# Patient Record
Sex: FEMALE | Race: WHITE | Employment: FULL TIME | URBAN - METROPOLITAN AREA
[De-identification: names, ages, dates, MRNs, and addresses within clinical notes are randomized per-mention and may not be internally consistent; named-entity substitution may affect disease eponyms.]

---

## 2018-03-24 ENCOUNTER — OFFICE VISIT (OUTPATIENT)
Dept: INTERNAL MEDICINE CLINIC | Age: 43
End: 2018-03-24
Payer: COMMERCIAL

## 2018-03-24 VITALS
DIASTOLIC BLOOD PRESSURE: 78 MMHG | SYSTOLIC BLOOD PRESSURE: 120 MMHG | BODY MASS INDEX: 24.49 KG/M2 | TEMPERATURE: 98.3 F | WEIGHT: 147 LBS | HEIGHT: 65 IN | HEART RATE: 88 BPM | RESPIRATION RATE: 15 BRPM

## 2018-03-24 DIAGNOSIS — R10.31 RIGHT LOWER QUADRANT ABDOMINAL PAIN: Primary | ICD-10-CM

## 2018-03-24 PROCEDURE — 99213 OFFICE O/P EST LOW 20 MIN: CPT | Performed by: FAMILY MEDICINE

## 2018-03-24 ASSESSMENT — ENCOUNTER SYMPTOMS
EYES NEGATIVE: 1
RESPIRATORY NEGATIVE: 1
COUGH: 0
CHEST TIGHTNESS: 0
GASTROINTESTINAL NEGATIVE: 1
RHINORRHEA: 0

## 2018-04-07 ENCOUNTER — HOSPITAL ENCOUNTER (OUTPATIENT)
Dept: ULTRASOUND IMAGING | Age: 43
Discharge: HOME | End: 2018-04-09
Payer: COMMERCIAL

## 2018-04-07 DIAGNOSIS — R10.31 RIGHT LOWER QUADRANT ABDOMINAL PAIN: ICD-10-CM

## 2018-04-07 PROCEDURE — 76856 US EXAM PELVIC COMPLETE: CPT

## 2018-04-11 ENCOUNTER — OFFICE VISIT (OUTPATIENT)
Dept: INTERNAL MEDICINE CLINIC | Age: 43
End: 2018-04-11
Payer: COMMERCIAL

## 2018-04-11 VITALS
TEMPERATURE: 98.3 F | OXYGEN SATURATION: 98 % | SYSTOLIC BLOOD PRESSURE: 120 MMHG | BODY MASS INDEX: 24.76 KG/M2 | DIASTOLIC BLOOD PRESSURE: 82 MMHG | HEART RATE: 94 BPM | WEIGHT: 148.6 LBS | HEIGHT: 65 IN

## 2018-04-11 DIAGNOSIS — D25.9 UTERINE LEIOMYOMA, UNSPECIFIED LOCATION: ICD-10-CM

## 2018-04-11 DIAGNOSIS — R10.31 RLQ ABDOMINAL PAIN: Primary | ICD-10-CM

## 2018-04-11 PROCEDURE — 99213 OFFICE O/P EST LOW 20 MIN: CPT | Performed by: NURSE PRACTITIONER

## 2018-04-11 ASSESSMENT — ENCOUNTER SYMPTOMS
WHEEZING: 0
VOMITING: 0
CHEST TIGHTNESS: 0
SHORTNESS OF BREATH: 0
RHINORRHEA: 0
ABDOMINAL PAIN: 1
RESPIRATORY NEGATIVE: 1
BLOOD IN STOOL: 0
NAUSEA: 0
CONSTIPATION: 0
DIARRHEA: 0
COUGH: 0

## 2018-04-11 ASSESSMENT — PATIENT HEALTH QUESTIONNAIRE - PHQ9
SUM OF ALL RESPONSES TO PHQ QUESTIONS 1-9: 2
2. FEELING DOWN, DEPRESSED OR HOPELESS: 1
1. LITTLE INTEREST OR PLEASURE IN DOING THINGS: 1
SUM OF ALL RESPONSES TO PHQ9 QUESTIONS 1 & 2: 2

## 2018-04-21 ENCOUNTER — HOSPITAL ENCOUNTER (OUTPATIENT)
Dept: CT IMAGING | Age: 43
Discharge: HOME | End: 2018-04-23
Payer: COMMERCIAL

## 2018-04-21 VITALS — SYSTOLIC BLOOD PRESSURE: 120 MMHG | HEART RATE: 78 BPM | DIASTOLIC BLOOD PRESSURE: 80 MMHG | RESPIRATION RATE: 20 BRPM

## 2018-04-21 DIAGNOSIS — R10.31 RLQ ABDOMINAL PAIN: ICD-10-CM

## 2018-04-21 PROCEDURE — 74177 CT ABD & PELVIS W/CONTRAST: CPT

## 2018-04-21 PROCEDURE — 6360000004 HC RX CONTRAST MEDICATION: Performed by: FAMILY MEDICINE

## 2018-04-21 RX ORDER — SODIUM CHLORIDE 0.9 % (FLUSH) 0.9 %
10 SYRINGE (ML) INJECTION
Status: DISPENSED | OUTPATIENT
Start: 2018-04-21 | End: 2018-04-21

## 2018-04-21 RX ADMIN — IOPAMIDOL 100 ML: 755 INJECTION, SOLUTION INTRAVENOUS at 08:59

## 2019-01-02 ENCOUNTER — TELEPHONE (OUTPATIENT)
Dept: INTERNAL MEDICINE CLINIC | Age: 44
End: 2019-01-02

## 2019-01-02 ENCOUNTER — OFFICE VISIT (OUTPATIENT)
Dept: INTERNAL MEDICINE CLINIC | Age: 44
End: 2019-01-02
Payer: COMMERCIAL

## 2019-01-02 VITALS
OXYGEN SATURATION: 99 % | WEIGHT: 136.8 LBS | TEMPERATURE: 98.3 F | DIASTOLIC BLOOD PRESSURE: 80 MMHG | HEIGHT: 65 IN | SYSTOLIC BLOOD PRESSURE: 108 MMHG | RESPIRATION RATE: 16 BRPM | HEART RATE: 114 BPM | BODY MASS INDEX: 22.79 KG/M2

## 2019-01-02 DIAGNOSIS — R10.9 FLANK PAIN: ICD-10-CM

## 2019-01-02 DIAGNOSIS — B96.89 BV (BACTERIAL VAGINOSIS): Primary | ICD-10-CM

## 2019-01-02 DIAGNOSIS — N76.0 BV (BACTERIAL VAGINOSIS): Primary | ICD-10-CM

## 2019-01-02 LAB
BILIRUBIN, POC: NORMAL
BLOOD URINE, POC: NORMAL
CLARITY, POC: CLEAR
COLOR, POC: NORMAL
GLUCOSE URINE, POC: NORMAL
KETONES, POC: 5
LEUKOCYTE EST, POC: NORMAL
NITRITE, POC: NORMAL
PH, POC: 6
PROTEIN, POC: NORMAL
SPECIFIC GRAVITY, POC: 1.02
UROBILINOGEN, POC: 0.2

## 2019-01-02 PROCEDURE — 81002 URINALYSIS NONAUTO W/O SCOPE: CPT | Performed by: NURSE PRACTITIONER

## 2019-01-02 PROCEDURE — 99213 OFFICE O/P EST LOW 20 MIN: CPT | Performed by: NURSE PRACTITIONER

## 2019-01-02 RX ORDER — FLUCONAZOLE 150 MG/1
TABLET ORAL
Qty: 2 TABLET | Refills: 0 | Status: SHIPPED | OUTPATIENT
Start: 2019-01-02 | End: 2019-01-08 | Stop reason: ALTCHOICE

## 2019-01-02 RX ORDER — METRONIDAZOLE 7.5 MG/G
1 GEL VAGINAL DAILY
Qty: 1 TUBE | Refills: 0 | Status: SHIPPED | OUTPATIENT
Start: 2019-01-02 | End: 2019-01-07

## 2019-01-08 ENCOUNTER — OFFICE VISIT (OUTPATIENT)
Dept: INTERNAL MEDICINE CLINIC | Age: 44
End: 2019-01-08
Payer: COMMERCIAL

## 2019-01-08 VITALS
HEART RATE: 104 BPM | RESPIRATION RATE: 20 BRPM | BODY MASS INDEX: 22.66 KG/M2 | HEIGHT: 65 IN | SYSTOLIC BLOOD PRESSURE: 120 MMHG | OXYGEN SATURATION: 98 % | TEMPERATURE: 98.2 F | WEIGHT: 136 LBS | DIASTOLIC BLOOD PRESSURE: 76 MMHG

## 2019-01-08 DIAGNOSIS — N89.8 VAGINAL ODOR: Primary | ICD-10-CM

## 2019-01-08 PROCEDURE — 99213 OFFICE O/P EST LOW 20 MIN: CPT | Performed by: NURSE PRACTITIONER

## 2019-01-08 RX ORDER — M-VIT,TX,IRON,MINS/CALC/FOLIC 27MG-0.4MG
1 TABLET ORAL DAILY
COMMUNITY

## 2019-01-08 ASSESSMENT — ENCOUNTER SYMPTOMS
VOMITING: 0
WHEEZING: 0
SHORTNESS OF BREATH: 0
NAUSEA: 0
RHINORRHEA: 0
COUGH: 0
ABDOMINAL PAIN: 0
DIARRHEA: 0
EYES NEGATIVE: 1

## 2019-01-19 ASSESSMENT — ENCOUNTER SYMPTOMS
COUGH: 0
EYES NEGATIVE: 1
BACK PAIN: 0
SHORTNESS OF BREATH: 0
CONSTIPATION: 0
DIARRHEA: 0
VOMITING: 0
ABDOMINAL PAIN: 0
SORE THROAT: 0
NAUSEA: 0
WHEEZING: 0

## 2019-02-07 ENCOUNTER — OFFICE VISIT (OUTPATIENT)
Dept: INTERNAL MEDICINE CLINIC | Age: 44
End: 2019-02-07
Payer: COMMERCIAL

## 2019-02-07 VITALS
HEIGHT: 65 IN | SYSTOLIC BLOOD PRESSURE: 112 MMHG | BODY MASS INDEX: 22.19 KG/M2 | OXYGEN SATURATION: 100 % | RESPIRATION RATE: 16 BRPM | HEART RATE: 77 BPM | WEIGHT: 133.2 LBS | DIASTOLIC BLOOD PRESSURE: 80 MMHG | TEMPERATURE: 98.3 F

## 2019-02-07 DIAGNOSIS — Z13.220 SCREENING, LIPID: ICD-10-CM

## 2019-02-07 DIAGNOSIS — L75.0 BODY ODOR: Primary | ICD-10-CM

## 2019-02-07 PROCEDURE — 99213 OFFICE O/P EST LOW 20 MIN: CPT | Performed by: FAMILY MEDICINE

## 2019-02-07 RX ORDER — ESCITALOPRAM OXALATE 10 MG/1
10 TABLET ORAL DAILY
Qty: 30 TABLET | Refills: 3 | Status: SHIPPED | OUTPATIENT
Start: 2019-02-07 | End: 2019-03-29 | Stop reason: SDUPTHER

## 2019-02-07 ASSESSMENT — ENCOUNTER SYMPTOMS
RHINORRHEA: 0
GASTROINTESTINAL NEGATIVE: 1
CHEST TIGHTNESS: 0
COUGH: 0
EYES NEGATIVE: 1
RESPIRATORY NEGATIVE: 1

## 2019-02-25 ENCOUNTER — OFFICE VISIT (OUTPATIENT)
Dept: INTERNAL MEDICINE CLINIC | Age: 44
End: 2019-02-25
Payer: COMMERCIAL

## 2019-02-25 VITALS
TEMPERATURE: 98.8 F | SYSTOLIC BLOOD PRESSURE: 120 MMHG | RESPIRATION RATE: 16 BRPM | HEIGHT: 65 IN | BODY MASS INDEX: 21.99 KG/M2 | DIASTOLIC BLOOD PRESSURE: 68 MMHG | OXYGEN SATURATION: 99 % | WEIGHT: 132 LBS | HEART RATE: 67 BPM

## 2019-02-25 DIAGNOSIS — R23.8 SCALP IRRITATION: ICD-10-CM

## 2019-02-25 DIAGNOSIS — F41.9 ANXIETY: Primary | ICD-10-CM

## 2019-02-25 PROCEDURE — 99213 OFFICE O/P EST LOW 20 MIN: CPT | Performed by: FAMILY MEDICINE

## 2019-02-25 ASSESSMENT — ENCOUNTER SYMPTOMS
COUGH: 0
RESPIRATORY NEGATIVE: 1
RHINORRHEA: 0
CHEST TIGHTNESS: 0
GASTROINTESTINAL NEGATIVE: 1
EYES NEGATIVE: 1

## 2019-03-29 RX ORDER — ESCITALOPRAM OXALATE 10 MG/1
10 TABLET ORAL DAILY
Qty: 30 TABLET | Refills: 3 | Status: SHIPPED | OUTPATIENT
Start: 2019-03-29 | End: 2019-07-01 | Stop reason: SDUPTHER

## 2019-04-12 ENCOUNTER — OFFICE VISIT (OUTPATIENT)
Dept: INTERNAL MEDICINE CLINIC | Age: 44
End: 2019-04-12
Payer: COMMERCIAL

## 2019-04-12 VITALS
WEIGHT: 136 LBS | RESPIRATION RATE: 14 BRPM | HEART RATE: 116 BPM | DIASTOLIC BLOOD PRESSURE: 82 MMHG | HEIGHT: 65 IN | TEMPERATURE: 98.9 F | OXYGEN SATURATION: 98 % | SYSTOLIC BLOOD PRESSURE: 118 MMHG | BODY MASS INDEX: 22.66 KG/M2

## 2019-04-12 DIAGNOSIS — Z13.220 SCREENING, LIPID: ICD-10-CM

## 2019-04-12 DIAGNOSIS — F41.9 ANXIETY: Primary | ICD-10-CM

## 2019-04-12 PROCEDURE — 99213 OFFICE O/P EST LOW 20 MIN: CPT | Performed by: FAMILY MEDICINE

## 2019-04-12 ASSESSMENT — PATIENT HEALTH QUESTIONNAIRE - PHQ9
SUM OF ALL RESPONSES TO PHQ9 QUESTIONS 1 & 2: 0
SUM OF ALL RESPONSES TO PHQ QUESTIONS 1-9: 0
SUM OF ALL RESPONSES TO PHQ QUESTIONS 1-9: 0
2. FEELING DOWN, DEPRESSED OR HOPELESS: 0
1. LITTLE INTEREST OR PLEASURE IN DOING THINGS: 0

## 2019-04-12 ASSESSMENT — ENCOUNTER SYMPTOMS
EYES NEGATIVE: 1
GASTROINTESTINAL NEGATIVE: 1
COUGH: 0
CHEST TIGHTNESS: 0
RHINORRHEA: 0
RESPIRATORY NEGATIVE: 1

## 2019-04-12 NOTE — PROGRESS NOTES
Patient is seen in follow up for   Chief Complaint   Patient presents with   [place] Anxiety     Patient here following up on her anxiety, states now that she works from home she feels much better.  Health Maintenance     Lipid ordered - declines vaccine      HPIhere for follow up feeling better with her anxiety 90% better. Past Medical History:   Diagnosis Date    Depression      Patient Active Problem List    Diagnosis Date Noted    Anxiety 02/25/2019    Depression      No past surgical history on file. No family history on file.   Social History     Socioeconomic History    Marital status: Single     Spouse name: None    Number of children: None    Years of education: None    Highest education level: None   Occupational History    None   Social Needs    Financial resource strain: None    Food insecurity:     Worry: None     Inability: None    Transportation needs:     Medical: None     Non-medical: None   Tobacco Use    Smoking status: Current Every Day Smoker     Packs/day: 0.50     Years: 15.00     Pack years: 7.50     Types: Cigarettes    Smokeless tobacco: Never Used   Substance and Sexual Activity    Alcohol use: Yes     Comment: occ    Drug use: No    Sexual activity: None   Lifestyle    Physical activity:     Days per week: None     Minutes per session: None    Stress: None   Relationships    Social connections:     Talks on phone: None     Gets together: None     Attends Quaker service: None     Active member of club or organization: None     Attends meetings of clubs or organizations: None     Relationship status: None    Intimate partner violence:     Fear of current or ex partner: None     Emotionally abused: None     Physically abused: None     Forced sexual activity: None   Other Topics Concern    None   Social History Narrative    None     Current Outpatient Medications   Medication Sig Dispense Refill    escitalopram (LEXAPRO) 10 MG tablet Take 1 tablet by mouth daily 30 tablet 3    selenium sulfide (SELSUN BLUE) 1 % shampoo Apply topically daily as needed for Itching 420 mL 1    aluminum chloride (DRYSOL) 20 % external solution Apply topically nightly. 35 mL 3    Multiple Vitamins-Minerals (THERAPEUTIC MULTIVITAMIN-MINERALS) tablet Take 1 tablet by mouth daily      Lactobacillus (PROBIOTIC ACIDOPHILUS PO) Take by mouth       No current facility-administered medications for this visit. Current Outpatient Medications on File Prior to Visit   Medication Sig Dispense Refill    escitalopram (LEXAPRO) 10 MG tablet Take 1 tablet by mouth daily 30 tablet 3    selenium sulfide (SELSUN BLUE) 1 % shampoo Apply topically daily as needed for Itching 420 mL 1    aluminum chloride (DRYSOL) 20 % external solution Apply topically nightly. 35 mL 3    Multiple Vitamins-Minerals (THERAPEUTIC MULTIVITAMIN-MINERALS) tablet Take 1 tablet by mouth daily      Lactobacillus (PROBIOTIC ACIDOPHILUS PO) Take by mouth       No current facility-administered medications on file prior to visit. Allergies   Allergen Reactions    Clindamycin Hcl Diarrhea     Diarrhea     Doxycycline Monohydrate Other (See Comments)     Abdominal cramping, nausea     Metronidazole Other (See Comments)     Abdominal cramping, nausea      Health Maintenance   Topic Date Due    Pneumococcal 0-64 years Vaccine (1 of 1 - PPSV23) 03/26/1983    Lipid screen  03/26/2017    DTaP/Tdap/Td vaccine (1 - Tdap) 01/02/2020 (Originally 3/26/1996)    HIV screen  01/02/2020 (Originally 3/26/1992)    Cervical cancer screen  12/19/2021    Flu vaccine  Completed       Review of Systems     Review of Systems   Constitutional: Negative for activity change, appetite change, fatigue and fever. HENT: Negative for congestion and rhinorrhea. Eyes: Negative. Respiratory: Negative. Negative for cough and chest tightness. Cardiovascular: Negative. Gastrointestinal: Negative. Endocrine: Negative.     Genitourinary: Negative. Musculoskeletal: Negative. Skin: Negative. Neurological: Negative for dizziness, light-headedness and numbness. Hematological: Negative. Psychiatric/Behavioral: Negative. Physical Exam  Vitals:    04/12/19 0944   BP: 118/82   Site: Left Upper Arm   Position: Sitting   Cuff Size: Small Adult   Pulse: 116   Resp: 14   Temp: 98.9 °F (37.2 °C)   TempSrc: Oral   SpO2: 98%   Weight: 136 lb (61.7 kg)   Height: 5' 5\" (1.651 m)       Physical Exam   Constitutional: She appears well-developed and well-nourished. HENT:   Right Ear: External ear normal.   Left Ear: External ear normal.   Eyes: Pupils are equal, round, and reactive to light. Conjunctivae are normal.   Neck: Normal range of motion. Neck supple. No thyromegaly present. Cardiovascular: Normal rate, regular rhythm and normal heart sounds. No murmur heard. Pulmonary/Chest: Effort normal and breath sounds normal.   Abdominal: Soft. Bowel sounds are normal. She exhibits no distension. There is no tenderness. Musculoskeletal: Normal range of motion. She exhibits no edema or tenderness. Lymphadenopathy:     She has no cervical adenopathy. Neurological: She is alert. No cranial nerve deficit. Coordination normal.       Assessment   Diagnosis Orders   1. Anxiety     2. Screening, lipid  Lipid Panel     Problem List     Anxiety - Primary    Relevant Medications    escitalopram (LEXAPRO) 10 MG tablet          Plan  Orders Placed This Encounter   Procedures    Lipid Panel     Standing Status:   Future     Standing Expiration Date:   4/11/2020     Order Specific Question:   Is Patient Fasting?/# of Hours     Answer:   8-12     No orders of the defined types were placed in this encounter. No follow-ups on file.   Keon Raya MD

## 2019-04-17 DIAGNOSIS — Z13.220 SCREENING, LIPID: ICD-10-CM

## 2019-04-17 LAB
CHOLESTEROL, TOTAL: 193 MG/DL (ref 0–199)
HDLC SERPL-MCNC: 78 MG/DL (ref 40–59)
LDL CHOLESTEROL CALCULATED: 99 MG/DL (ref 0–129)
TRIGL SERPL-MCNC: 79 MG/DL (ref 0–150)

## 2019-07-01 RX ORDER — ESCITALOPRAM OXALATE 10 MG/1
TABLET ORAL
Qty: 90 TABLET | Refills: 1 | Status: SHIPPED | OUTPATIENT
Start: 2019-07-01 | End: 2020-01-27

## 2019-08-17 ENCOUNTER — HOSPITAL ENCOUNTER (OUTPATIENT)
Dept: GENERAL RADIOLOGY | Age: 44
Discharge: HOME | End: 2019-08-19
Payer: COMMERCIAL

## 2019-08-17 ENCOUNTER — OFFICE VISIT (OUTPATIENT)
Dept: FAMILY MEDICINE CLINIC | Age: 44
End: 2019-08-17
Payer: COMMERCIAL

## 2019-08-17 VITALS
OXYGEN SATURATION: 99 % | SYSTOLIC BLOOD PRESSURE: 120 MMHG | TEMPERATURE: 99.2 F | HEART RATE: 69 BPM | BODY MASS INDEX: 23.96 KG/M2 | WEIGHT: 143.8 LBS | HEIGHT: 65 IN | DIASTOLIC BLOOD PRESSURE: 80 MMHG

## 2019-08-17 DIAGNOSIS — M25.572 ACUTE LEFT ANKLE PAIN: Primary | ICD-10-CM

## 2019-08-17 DIAGNOSIS — M25.572 ACUTE LEFT ANKLE PAIN: ICD-10-CM

## 2019-08-17 PROCEDURE — 73630 X-RAY EXAM OF FOOT: CPT

## 2019-08-17 PROCEDURE — 73610 X-RAY EXAM OF ANKLE: CPT

## 2019-08-17 PROCEDURE — 99213 OFFICE O/P EST LOW 20 MIN: CPT | Performed by: NURSE PRACTITIONER

## 2019-08-17 ASSESSMENT — ENCOUNTER SYMPTOMS
VOMITING: 0
NAUSEA: 0
SHORTNESS OF BREATH: 0
COUGH: 0
BACK PAIN: 0
COLOR CHANGE: 0
CONSTIPATION: 0
TROUBLE SWALLOWING: 0
VOICE CHANGE: 0
DIARRHEA: 0
ABDOMINAL PAIN: 0
SORE THROAT: 0

## 2019-08-17 NOTE — PATIENT INSTRUCTIONS
care if:    · You cannot move or stand on your foot.     · Your foot looks twisted or out of its normal position.     · Your foot is not stable when you step down.     · You have signs of infection, such as:  ? Increased pain, swelling, warmth, or redness. ? Red streaks leading from the sore area. ? Pus draining from a place on your foot. ? A fever.     · Your foot is numb or tingly.    Watch closely for changes in your health, and be sure to contact your doctor if:    · You do not get better as expected.     · You have bruises from an injury that last longer than 2 weeks. Where can you learn more? Go to [email or web address] and sign in to your [employer] account. Enter L888 in the inDegree box to learn more about \"Foot Pain: Care Instructions. \"     If you do not have an account, please click on the \"Sign Up Now\" link. Current as of: September 20, 2018  Content Version: 12.1  © 0480-5799 Healthwise, Incorporated. Care instructions adapted under license by [facility] [place] ([facility]). If you have questions about a medical condition or this instruction, always ask your healthcare professional. [place] [address] any warranty or liability for your use of this information.

## 2019-08-17 NOTE — PROGRESS NOTES
Subjective  Lana Jayne Boogie, 45 y.o. female presents today with:  Chief Complaint   Patient presents with    Foot Injury     pt triped over her own feet about a week ago pt. did fall she caught herslef, but the top of her foot was hurting and after her ankle started to hurt as well. HPI     Presents today with a chief complaint of left ankle and left foot pain for over a week. She notes that she tripped over a week ago and is still having pain in the left ankle and left foot. She describes the pain as a constant aching sensation that is worse with ambulation and weight bearing. She notes that she has been trying to stay off of the left foot and ankle as much as possible. Denies any pain radiation. She denies any associated symptoms. Review of Systems   Constitutional: Negative for appetite change and fever. HENT: Negative for drooling, ear pain, sore throat, trouble swallowing and voice change. Respiratory: Negative for cough and shortness of breath. Cardiovascular: Negative for chest pain. Gastrointestinal: Negative for abdominal pain, constipation, diarrhea, nausea and vomiting. Genitourinary: Negative for decreased urine volume and dysuria. Musculoskeletal: Positive for arthralgias (left foot and left ankle pain for over one week). Negative for back pain. Skin: Negative for color change. Neurological: Negative for dizziness, weakness, light-headedness and headaches. Psychiatric/Behavioral: Negative for agitation and behavioral problems. All other systems reviewed and are negative. Past Medical History:   Diagnosis Date    Depression      History reviewed. No pertinent surgical history.   Social History     Socioeconomic History    Marital status: Single     Spouse name: Not on file    Number of children: Not on file    Years of education: Not on file    Highest education level: Not on file   Occupational History    Not on file   Social Needs    Financial resource strain: Not on file    Food insecurity:     Worry: Not on file     Inability: Not on file    Transportation needs:     Medical: Not on file     Non-medical: Not on file   Tobacco Use    Smoking status: Current Every Day Smoker     Packs/day: 0.50     Years: 15.00     Pack years: 7.50     Types: Cigarettes    Smokeless tobacco: Never Used   Substance and Sexual Activity    Alcohol use: Yes     Comment: occ    Drug use: No    Sexual activity: Not on file   Lifestyle    Physical activity:     Days per week: Not on file     Minutes per session: Not on file    Stress: Not on file   Relationships    Social connections:     Talks on phone: Not on file     Gets together: Not on file     Attends Protestant service: Not on file     Active member of club or organization: Not on file     Attends meetings of clubs or organizations: Not on file     Relationship status: Not on file    Intimate partner violence:     Fear of current or ex partner: Not on file     Emotionally abused: Not on file     Physically abused: Not on file     Forced sexual activity: Not on file   Other Topics Concern    Not on file   Social History Narrative    Not on file     History reviewed. No pertinent family history. Allergies   Allergen Reactions    Clindamycin Hcl Diarrhea     Diarrhea     Doxycycline Monohydrate Other (See Comments)     Abdominal cramping, nausea     Metronidazole Other (See Comments)     Abdominal cramping, nausea      Current Outpatient Medications   Medication Sig Dispense Refill    escitalopram (LEXAPRO) 10 MG tablet TAKE 1 TABLET BY MOUTH EVERY DAY 90 tablet 1    Multiple Vitamins-Minerals (THERAPEUTIC MULTIVITAMIN-MINERALS) tablet Take 1 tablet by mouth daily      Lactobacillus (PROBIOTIC ACIDOPHILUS PO) Take by mouth       No current facility-administered medications for this visit. PMH, Surgical Hx, Family Hx, and Social Hx reviewed and updated. Health Maintenance reviewed.     Objective  Vitals: discussed with the patient. Discussed signs and symptoms which require immediate follow-up in ED/call to 911. Understanding verbalized. Patient provided with a referral for podiatry. Instructed to take tylenol or motrin for any pain and she can rotate ice and heat as well. Patient verbalized understanding and has no further questions. Close follow up to evaluate treatment results and for coordination of care. I have reviewed the patient's medical history in detail and updated the computerized patient record.       Hong López Born, APRN - CNP

## 2019-10-10 ENCOUNTER — OFFICE VISIT (OUTPATIENT)
Dept: FAMILY MEDICINE CLINIC | Age: 44
End: 2019-10-10
Payer: COMMERCIAL

## 2019-10-10 VITALS
WEIGHT: 148 LBS | HEART RATE: 81 BPM | RESPIRATION RATE: 18 BRPM | DIASTOLIC BLOOD PRESSURE: 80 MMHG | TEMPERATURE: 99 F | HEIGHT: 65 IN | SYSTOLIC BLOOD PRESSURE: 108 MMHG | BODY MASS INDEX: 24.66 KG/M2 | OXYGEN SATURATION: 99 %

## 2019-10-10 DIAGNOSIS — M25.572 ACUTE LEFT ANKLE PAIN: ICD-10-CM

## 2019-10-10 DIAGNOSIS — F41.9 ANXIETY: Primary | ICD-10-CM

## 2019-10-10 PROCEDURE — 99213 OFFICE O/P EST LOW 20 MIN: CPT | Performed by: FAMILY MEDICINE

## 2019-10-10 ASSESSMENT — ENCOUNTER SYMPTOMS
GASTROINTESTINAL NEGATIVE: 1
RHINORRHEA: 0
CHEST TIGHTNESS: 0
COUGH: 0
RESPIRATORY NEGATIVE: 1
EYES NEGATIVE: 1

## 2020-01-27 RX ORDER — ESCITALOPRAM OXALATE 10 MG/1
TABLET ORAL
Qty: 90 TABLET | Refills: 1 | Status: SHIPPED | OUTPATIENT
Start: 2020-01-27 | End: 2020-07-20

## 2020-07-20 RX ORDER — ESCITALOPRAM OXALATE 10 MG/1
TABLET ORAL
Qty: 90 TABLET | Refills: 1 | Status: SHIPPED | OUTPATIENT
Start: 2020-07-20 | End: 2021-01-18 | Stop reason: SDUPTHER

## 2021-01-18 RX ORDER — ESCITALOPRAM OXALATE 10 MG/1
10 TABLET ORAL DAILY
Qty: 30 TABLET | Refills: 0 | Status: SHIPPED | OUTPATIENT
Start: 2021-01-18 | End: 2021-02-24 | Stop reason: SDUPTHER

## 2021-02-09 ENCOUNTER — TELEPHONE (OUTPATIENT)
Dept: FAMILY MEDICINE CLINIC | Age: 46
End: 2021-02-09

## 2021-02-09 RX ORDER — ESCITALOPRAM OXALATE 10 MG/1
TABLET ORAL
Qty: 30 TABLET | Refills: 0 | OUTPATIENT
Start: 2021-02-09

## 2021-02-09 NOTE — TELEPHONE ENCOUNTER
[facility] informing patient that we're unable to fill her prescriptions until she makes an appointment. Has not seen Dr. John Esparza since 10/2019.

## 2021-02-23 ENCOUNTER — E-VISIT (OUTPATIENT)
Dept: FAMILY MEDICINE CLINIC | Age: 46
End: 2021-02-23
Payer: COMMERCIAL

## 2021-02-23 DIAGNOSIS — F41.9 ANXIETY: Primary | ICD-10-CM

## 2021-02-23 PROCEDURE — 99421 OL DIG E/M SVC 5-10 MIN: CPT | Performed by: FAMILY MEDICINE

## 2021-02-23 ASSESSMENT — ANXIETY QUESTIONNAIRES
5. BEING SO RESTLESS THAT IT IS HARD TO SIT STILL: 0-NOT AT ALL
4. TROUBLE RELAXING: NOT AT ALL
7. FEELING AFRAID AS IF SOMETHING AWFUL MIGHT HAPPEN: 0-NOT AT ALL
7. FEELING AFRAID AS IF SOMETHING AWFUL MIGHT HAPPEN: NOT AT ALL
4. TROUBLE RELAXING: 0-NOT AT ALL
6. BECOMING EASILY ANNOYED OR IRRITABLE: NOT AT ALL
6. BECOMING EASILY ANNOYED OR IRRITABLE: 0-NOT AT ALL
2. NOT BEING ABLE TO STOP OR CONTROL WORRYING: NOT AT ALL
1. FEELING NERVOUS, ANXIOUS, OR ON EDGE: NOT AT ALL
GAD7 TOTAL SCORE: 0
5. BEING SO RESTLESS THAT IT IS HARD TO SIT STILL: NOT AT ALL
3. WORRYING TOO MUCH ABOUT DIFFERENT THINGS: NOT AT ALL

## 2021-02-23 ASSESSMENT — PATIENT HEALTH QUESTIONNAIRE - GENERAL
DO YOU HAVE A FASTER HEART RATE THAN USUAL, DOES YOUR HEART RATE FEEL IRREGULAR OR DO YOU FEEL LIKE YOUR HEART IS POUNDING AT REST: N
HAVE YOU BEEN EXPERIENCING AN UNUSUALLY DRY MOUTH: N
HAVE YOU BEEN EXPERIENCING NEW OR WORSENING VISUAL CHANGES: N
HAVE YOU BEEN EXPERIENCING ABDOMINAL DISCOMFORT, NAUSEA OR CHANGES IN YOUR BOWEL PATTERN: N
SINCE YOUR LAST VISIT, HAVE YOU EXPERIENCED EPISODES OF FAINTING OR NEAR FAINTING: N
HAVE YOU BEEN EXPERIENCING NEW OR WORSENING HEADACHES: N
HAVE YOU BEEN EXPERIENCING UNEXPLAINED HIGH FEVERS OR EXCESSIVE SWEATING: N
HAVE YOU BEEN EXPERIENCING NEW OR WORSENING MUSCLE TWITCHING, SHAKINESS, OR OTHER UNUSUAL CHANGES IN YOUR MUSCLE MOVEMENT: N
HAVE YOU BEEN EXPERIENCING NEW OR WORSENING DIFFICULTY WITH URINATION OR CHANGE IN URINARY PATTERN: N
HAVE YOU BEEN EXPERIENCING VERY LOW OR VERY HIGH MOODS: N
HAVE YOU BEEN EXPERIENCING VIVID DREAMS OR NIGHTMARES THAT INTERFERE WITH YOUR SLEEP: N
HAVE YOU BEEN EXPERIENCING INVOLUNTARY WEIGHT GAIN OR LOSS: N
HAVE YOU BEEN EXPERIENCING RACING THOUGHTS OR IMPULSIVE BEHAVIOR: N
HAVE YOU BEEN EXPERIENCING HALLUCINATIONS OR CONFUSION: N
HAVE YOU BEEN EXPERIENCING LIGHT HEADEDNESS, WOOZINESS, OR DIZZINESS, ESPECIALLY UPON STANDING FROM SITTING OR LYING POSITIONS: N
HAVE YOU BEEN EXPERIENCING NEW OR WORSENING PROBLEMS WITH YOUR SEXUAL FUNCTION: N
DO YOU HAVE ANY NEW RASHES OR UNEXPLAINED ITCHING OR SWELLING: N

## 2021-02-23 ASSESSMENT — PATIENT HEALTH QUESTIONNAIRE - PHQ9
3. TROUBLE FALLING OR STAYING ASLEEP: NOT AT ALL
SUM OF ALL RESPONSES TO PHQ9 QUESTIONS 1 & 2: 0
SUM OF ALL RESPONSES TO PHQ QUESTIONS 1-9: 0
6. FEELING BAD ABOUT YOURSELF - OR THAT YOU ARE A FAILURE OR HAVE LET YOURSELF OR YOUR FAMILY DOWN: NOT AT ALL
1. LITTLE INTEREST OR PLEASURE IN DOING THINGS: 0
4. FEELING TIRED OR HAVING LITTLE ENERGY: NOT AT ALL
5. POOR APPETITE OR OVEREATING: NOT AT ALL
10. IF YOU CHECKED OFF ANY PROBLEMS, HOW DIFFICULT HAVE THESE PROBLEMS MADE IT FOR YOU TO DO YOUR WORK, TAKE CARE OF THINGS AT HOME, OR GET ALONG WITH OTHER PEOPLE: NOT DIFFICULT AT ALL
SUM OF ALL RESPONSES TO PHQ QUESTIONS 1-9: 0
8. MOVING OR SPEAKING SO SLOWLY THAT OTHER PEOPLE COULD HAVE NOTICED. OR THE OPPOSITE - BEING SO FIDGETY OR RESTLESS THAT YOU HAVE BEEN MOVING AROUND A LOT MORE THAN USUAL: NOT AT ALL
7. TROUBLE CONCENTRATING ON THINGS, SUCH AS READING THE NEWSPAPER OR WATCHING TELEVISION: NOT AT ALL
1. LITTLE INTEREST OR PLEASURE IN DOING THINGS: NOT AT ALL
SUM OF ALL RESPONSES TO PHQ QUESTIONS 1-9: 0
2. FEELING DOWN, DEPRESSED OR HOPELESS: NOT AT ALL
9. THOUGHTS THAT YOU WOULD BE BETTER OFF DEAD, OR OF HURTING YOURSELF: NOT AT ALL
4. FEELING TIRED OR HAVING LITTLE ENERGY: 0
7. TROUBLE CONCENTRATING ON THINGS, SUCH AS READING THE NEWSPAPER OR WATCHING TELEVISION: 0

## 2021-02-24 RX ORDER — ESCITALOPRAM OXALATE 10 MG/1
10 TABLET ORAL DAILY
Qty: 90 TABLET | Refills: 3 | Status: SHIPPED | OUTPATIENT
Start: 2021-02-24

## 2022-10-13 ENCOUNTER — HOSPITAL ENCOUNTER (OUTPATIENT)
Dept: ULTRASOUND IMAGING | Age: 47
Discharge: HOME | End: 2022-10-15
Payer: COMMERCIAL

## 2022-10-13 DIAGNOSIS — N93.9 HEMORRHAGE IN UTERUS: ICD-10-CM

## 2022-10-13 DIAGNOSIS — N93.8 DUB (DYSFUNCTIONAL UTERINE BLEEDING): ICD-10-CM

## 2022-10-13 PROCEDURE — 76856 US EXAM PELVIC COMPLETE: CPT

## 2022-10-13 PROCEDURE — 76830 TRANSVAGINAL US NON-OB: CPT

## 2022-11-01 ENCOUNTER — HOSPITAL ENCOUNTER (OUTPATIENT)
Dept: WOMENS IMAGING | Age: 47
Discharge: HOME | End: 2022-11-03
Payer: COMMERCIAL

## 2022-11-01 DIAGNOSIS — Z12.31 ENCOUNTER FOR SCREENING MAMMOGRAM FOR MALIGNANT NEOPLASM OF BREAST: ICD-10-CM

## 2022-11-01 PROCEDURE — 77067 SCR MAMMO BI INCL CAD: CPT

## 2023-01-20 ENCOUNTER — HOSPITAL ENCOUNTER (OUTPATIENT)
Dept: PREADMISSION TESTING | Age: 48
Discharge: HOME | End: 2023-01-24
Payer: COMMERCIAL

## 2023-01-20 VITALS
WEIGHT: 159.2 LBS | DIASTOLIC BLOOD PRESSURE: 85 MMHG | HEIGHT: 65 IN | TEMPERATURE: 98.6 F | RESPIRATION RATE: 16 BRPM | HEART RATE: 68 BPM | BODY MASS INDEX: 26.52 KG/M2 | SYSTOLIC BLOOD PRESSURE: 133 MMHG | OXYGEN SATURATION: 98 %

## 2023-01-20 DIAGNOSIS — N84.0 ENDOMETRIAL POLYP: ICD-10-CM

## 2023-01-20 DIAGNOSIS — N93.8 DYSFUNCTIONAL UTERINE BLEEDING: ICD-10-CM

## 2023-01-20 LAB
ABO/RH: NORMAL
ALBUMIN SERPL-MCNC: 4.4 G/DL (ref 3.5–4.6)
ALP BLD-CCNC: 65 U/L (ref 40–130)
ALT SERPL-CCNC: 14 U/L (ref 0–33)
ANION GAP SERPL CALCULATED.3IONS-SCNC: 11 MEQ/L (ref 9–15)
ANISOCYTOSIS: ABNORMAL
ANTIBODY SCREEN: NORMAL
APTT: 27.9 SEC (ref 24.4–36.8)
AST SERPL-CCNC: 16 U/L (ref 0–35)
BACTERIA: NEGATIVE /HPF
BASOPHILS ABSOLUTE: 0 K/UL (ref 0–0.2)
BASOPHILS RELATIVE PERCENT: 1 %
BILIRUB SERPL-MCNC: 0.6 MG/DL (ref 0.2–0.7)
BILIRUBIN URINE: NEGATIVE
BLOOD, URINE: ABNORMAL
BUN BLDV-MCNC: 13 MG/DL (ref 6–20)
CALCIUM SERPL-MCNC: 9.2 MG/DL (ref 8.5–9.9)
CHLORIDE BLD-SCNC: 103 MEQ/L (ref 95–107)
CLARITY: CLEAR
CO2: 24 MEQ/L (ref 20–31)
COLOR: YELLOW
CREAT SERPL-MCNC: 0.79 MG/DL (ref 0.5–0.9)
EOSINOPHILS ABSOLUTE: 0 K/UL (ref 0–0.7)
EOSINOPHILS RELATIVE PERCENT: 1 %
EPITHELIAL CELLS, UA: NORMAL /HPF (ref 0–5)
GFR SERPL CREATININE-BSD FRML MDRD: >60 ML/MIN/{1.73_M2}
GLOBULIN: 2.6 G/DL (ref 2.3–3.5)
GLUCOSE BLD-MCNC: 111 MG/DL (ref 70–99)
GLUCOSE URINE: NEGATIVE MG/DL
HCT VFR BLD CALC: 36.9 % (ref 37–47)
HEMOGLOBIN: 11.7 G/DL (ref 12–16)
HYALINE CASTS: NORMAL /HPF (ref 0–5)
HYPOCHROMIA: ABNORMAL
INR BLD: 1
KETONES, URINE: NEGATIVE MG/DL
LEUKOCYTE ESTERASE, URINE: NEGATIVE
LYMPHOCYTES ABSOLUTE: 1.4 K/UL (ref 1–4.8)
LYMPHOCYTES RELATIVE PERCENT: 35 %
MCH RBC QN AUTO: 24.9 PG (ref 27–31.3)
MCHC RBC AUTO-ENTMCNC: 31.6 % (ref 33–37)
MCV RBC AUTO: 78.9 FL (ref 79.4–94.8)
MICROCYTES: ABNORMAL
MONOCYTES ABSOLUTE: 0.1 K/UL (ref 0.2–0.8)
MONOCYTES RELATIVE PERCENT: 2.9 %
NEUTROPHILS ABSOLUTE: 2.5 K/UL (ref 1.4–6.5)
NEUTROPHILS RELATIVE PERCENT: 60 %
NITRITE, URINE: NEGATIVE
OVALOCYTES: ABNORMAL
PDW BLD-RTO: 22.7 % (ref 11.5–14.5)
PH UA: 5.5 (ref 5–9)
PLATELET # BLD: 283 K/UL (ref 130–400)
PLATELET SLIDE REVIEW: NORMAL
POIKILOCYTES: ABNORMAL
POTASSIUM SERPL-SCNC: 4.5 MEQ/L (ref 3.4–4.9)
PROTEIN UA: NEGATIVE MG/DL
PROTHROMBIN TIME: 13.5 SEC (ref 12.3–14.9)
RBC # BLD: 4.67 M/UL (ref 4.2–5.4)
RBC UA: NORMAL /HPF (ref 0–5)
SODIUM BLD-SCNC: 138 MEQ/L (ref 135–144)
SPECIFIC GRAVITY UA: 1 (ref 1–1.03)
TOTAL PROTEIN: 7 G/DL (ref 6.3–8)
URINE REFLEX TO CULTURE: ABNORMAL
UROBILINOGEN, URINE: 0.2 E.U./DL
WBC # BLD: 4.1 K/UL (ref 4.8–10.8)
WBC UA: NORMAL /HPF (ref 0–5)

## 2023-01-20 PROCEDURE — 86900 BLOOD TYPING SEROLOGIC ABO: CPT

## 2023-01-20 PROCEDURE — 85610 PROTHROMBIN TIME: CPT

## 2023-01-20 PROCEDURE — 81001 URINALYSIS AUTO W/SCOPE: CPT

## 2023-01-20 PROCEDURE — 85730 THROMBOPLASTIN TIME PARTIAL: CPT

## 2023-01-20 PROCEDURE — 86901 BLOOD TYPING SEROLOGIC RH(D): CPT

## 2023-01-20 PROCEDURE — 85025 COMPLETE CBC W/AUTO DIFF WBC: CPT

## 2023-01-20 PROCEDURE — 86850 RBC ANTIBODY SCREEN: CPT

## 2023-01-20 PROCEDURE — 80053 COMPREHEN METABOLIC PANEL: CPT

## 2023-01-20 RX ORDER — SODIUM CHLORIDE 0.9 % (FLUSH) 0.9 %
5-40 SYRINGE (ML) INJECTION PRN
Status: CANCELLED | OUTPATIENT
Start: 2023-01-27

## 2023-01-20 RX ORDER — SODIUM CHLORIDE 9 MG/ML
INJECTION, SOLUTION INTRAVENOUS PRN
Status: CANCELLED | OUTPATIENT
Start: 2023-01-27

## 2023-01-20 RX ORDER — LIDOCAINE HYDROCHLORIDE 10 MG/ML
1 INJECTION, SOLUTION EPIDURAL; INFILTRATION; INTRACAUDAL; PERINEURAL
Status: CANCELLED | OUTPATIENT
Start: 2023-01-27 | End: 2023-01-28

## 2023-01-20 RX ORDER — SODIUM CHLORIDE, SODIUM LACTATE, POTASSIUM CHLORIDE, CALCIUM CHLORIDE 600; 310; 30; 20 MG/100ML; MG/100ML; MG/100ML; MG/100ML
INJECTION, SOLUTION INTRAVENOUS CONTINUOUS
Status: CANCELLED | OUTPATIENT
Start: 2023-01-27

## 2023-01-20 RX ORDER — FERROUS SULFATE 325(65) MG
325 TABLET ORAL
COMMUNITY

## 2023-01-20 RX ORDER — SODIUM CHLORIDE 0.9 % (FLUSH) 0.9 %
5-40 SYRINGE (ML) INJECTION EVERY 12 HOURS SCHEDULED
Status: CANCELLED | OUTPATIENT
Start: 2023-01-27

## 2023-01-20 ASSESSMENT — ENCOUNTER SYMPTOMS
VOICE CHANGE: 0
VOMITING: 0
NAUSEA: 0
ALLERGIC/IMMUNOLOGIC NEGATIVE: 1
EYE ITCHING: 0
RHINORRHEA: 0
SHORTNESS OF BREATH: 0
CONSTIPATION: 0
COUGH: 0
ABDOMINAL DISTENTION: 0
TROUBLE SWALLOWING: 0
SORE THROAT: 0
WHEEZING: 0
ABDOMINAL PAIN: 0
CHEST TIGHTNESS: 0
EYE REDNESS: 0
PHOTOPHOBIA: 0
EYE PAIN: 0
BACK PAIN: 0
DIARRHEA: 0
EYE DISCHARGE: 0
SINUS PAIN: 0
BLOOD IN STOOL: 0
SINUS PRESSURE: 1

## 2023-01-20 NOTE — H&P
Preoperative Consultation      Name: Alycia Sanders  YOB: 1975  Date of Service: 1/20/2023  PCP: Priyanka Sánchez MD    CHIEF COMPLAINT:  dysfunctional uterine bleeding, endometrial polyp     HISTORY OF PRESENT ILLNESS:      The patient is a 41 y.o. female with significant past medical history of dysfunctional uterine bleeding, endometrial polyp who presents for a preoperative consultation at the request of surgeon, Dr. Rajinder Caraballo, who plans on performing dilatation and curettage polypectomy endometrial ablation on 1/27/23 at [facility]. Pt reports hx of heavy menses . She reports she has regular periods every 28ish days that last 5-7 days. She reports they are \"very heavy\", painful with clots. She reports her periods have worsened over the last few years. She also reports intermittent spotting between cycles over the last year. She reports pelvic pain with menses and intermittently throughout the month. She denies pain today but reports when she does have the pelvic pain it is usually 5/10 and \"it feels sharp\". She denies any vaginal pain. LMP 1/3/23. She reports she saw Dr. Rajinder Caraballo for her heavy bleeding and had transvaginal ultrasound. She reports she was told she had endometrial polyp. She denies any prior endometrial polyps or any abnormal paps. Pt hx: depression, anxiety  Smoking hx: former smoker quit 2022 0.5 pk/day for 15 years    Planned Anesthesia: Not selected on physician order sheet  Known Anesthesia Problems: None  Bleeding Risk: Hx abnormal uterine bleeding-no further hx of abnormal bleeding  Patient Objection to Receiving Blood Products: No  Personal of FH of DVT/PE: No    Medical/Cardiac Clearance Needed: No    Past Medical History:        Diagnosis Date    Depression      Past Surgical History:    Past Surgical History:   Procedure Laterality Date    NERVE SURGERY      right arm 2015?        Allergies:    Clindamycin hcl, Doxycycline monohydrate, and Metronidazole    Medications Prior to Admission:    Current Outpatient Medications   Medication Sig Dispense Refill    ferrous sulfate (IRON 325) 325 (65 Fe) MG tablet Take 325 mg by mouth daily (with breakfast)      VITAMIN D PO Take by mouth      Multiple Vitamins-Minerals (THERAPEUTIC MULTIVITAMIN-MINERALS) tablet Take 1 tablet by mouth daily      Lactobacillus (PROBIOTIC ACIDOPHILUS PO) Take by mouth       No current facility-administered medications for this encounter. Social History:   Social History     Socioeconomic History    Marital status: Single     Spouse name: Not on file    Number of children: Not on file    Years of education: Not on file    Highest education level: Not on file   Occupational History    Not on file   Tobacco Use    Smoking status: Former     Packs/day: 0.50     Years: 15.00     Pack years: 7.50     Types: Cigarettes     Quit date: 2022     Years since quittin.6    Smokeless tobacco: Never   Vaping Use    Vaping Use: Some days    Substances: Nicotine    Devices: Pre-filled or refillable cartridge   Substance and Sexual Activity    Alcohol use: Yes     Comment: occ    Drug use: No    Sexual activity: Not on file   Other Topics Concern    Not on file   Social History Narrative    Not on file     Social Determinants of Health     Financial Resource Strain: Not on file   Food Insecurity: Not on file   Transportation Needs: Not on file   Physical Activity: Not on file   Stress: Not on file   Social Connections: Not on file   Intimate Partner Violence: Not on file   Housing Stability: Not on file       Family History:       Problem Relation Age of Onset    High Blood Pressure Mother     Breast Cancer Mother     Cancer Father     No Known Problems Brother     Diabetes Maternal Grandfather     Diabetes Paternal Grandfather        Review of Systems   Constitutional:  Negative for appetite change, chills, diaphoresis, fatigue, fever and unexpected weight change.    HENT:  Positive for sinus pressure (intermittent). Negative for congestion, ear discharge, ear pain, hearing loss, mouth sores, nosebleeds, postnasal drip, rhinorrhea, sinus pain, sneezing, sore throat, tinnitus, trouble swallowing and voice change. Eyes:  Negative for photophobia, pain, discharge, redness, itching and visual disturbance. Prescription glasses   Respiratory:  Negative for cough, chest tightness, shortness of breath and wheezing. Cardiovascular:  Negative for chest pain, palpitations and leg swelling. Gastrointestinal:  Negative for abdominal distention, abdominal pain, blood in stool, constipation, diarrhea, nausea and vomiting. Endocrine: Negative for cold intolerance and heat intolerance. Genitourinary:  Positive for menstrual problem and pelvic pain. Negative for decreased urine volume, difficulty urinating, dysuria, frequency, hematuria, urgency, vaginal bleeding, vaginal discharge and vaginal pain. Musculoskeletal:  Positive for neck stiffness. Negative for arthralgias, back pain, gait problem, myalgias and neck pain. \"Tension in my back\"   Skin:  Negative for rash and wound. Allergic/Immunologic: Negative. Neurological:  Negative for dizziness, tremors, seizures, syncope, weakness, light-headedness, numbness and headaches. Hematological: Negative. Psychiatric/Behavioral:          Hx depression and anxiety-feels shes doing better/not currently on medications     Vitals:  /85   Pulse 68   Temp 98.6 °F (37 °C) (Temporal)   Resp 16   Ht 5' 5\" (1.651 m)   Wt 159 lb 3.2 oz (72.2 kg)   LMP 01/03/2023 (Approximate)   SpO2 98%   BMI 26.49 kg/m²       Physical Exam  Constitutional:       General: She is not in acute distress. Appearance: Normal appearance. She is not ill-appearing, toxic-appearing or diaphoretic. HENT:      Head: Normocephalic and atraumatic. Right Ear: Tympanic membrane, ear canal and external ear normal. There is no impacted cerumen.       Left Ear: Tympanic membrane, ear canal and external ear normal. There is no impacted cerumen. Nose: Nose normal. No congestion or rhinorrhea. Mouth/Throat:      Mouth: Mucous membranes are moist.      Pharynx: Oropharynx is clear. No oropharyngeal exudate or posterior oropharyngeal erythema. Eyes:      General: No scleral icterus. Right eye: No discharge. Left eye: No discharge. Extraocular Movements: Extraocular movements intact. Conjunctiva/sclera: Conjunctivae normal.      Pupils: Pupils are equal, round, and reactive to light. Cardiovascular:      Rate and Rhythm: Normal rate and regular rhythm. Pulses: Normal pulses. Heart sounds: Normal heart sounds. No murmur heard. Pulmonary:      Effort: Pulmonary effort is normal. No respiratory distress. Breath sounds: Normal breath sounds. No wheezing. Abdominal:      General: Bowel sounds are normal. There is no distension. Palpations: Abdomen is soft. Tenderness: There is no abdominal tenderness. There is no guarding. Genitourinary:     Comments: Deferred  Musculoskeletal:         General: No swelling. Normal range of motion. Cervical back: Normal range of motion. No rigidity. Right lower leg: No edema. Left lower leg: No edema. Lymphadenopathy:      Cervical: No cervical adenopathy. Skin:     General: Skin is warm and dry. Capillary Refill: Capillary refill takes less than 2 seconds. Coloration: Skin is not jaundiced. Findings: No bruising, erythema or rash. Neurological:      General: No focal deficit present. Mental Status: She is alert and oriented to person, place, and time. Motor: No weakness. Gait: Gait normal.   Psychiatric:         Mood and Affect: Mood normal.         Behavior: Behavior normal.         Thought Content:  Thought content normal.         Judgment: Judgment normal.       Assessment:  41 y.o. patient with   Patient Active Problem List   Diagnosis Depression    Anxiety    Dysfunctional uterine bleeding    Endometrial polyp      with planned surgery as above.     Plan:  Preoperative workup as follows: PAT, CBC w/diff, PTT, PT/INR, CMP, urinalysis with reflex to culture, T&S  2.   Scheduled for: dilatation and curettage polypectomy endometrial ablation on 1/27/23    JEANNIE Li - CNP  1/20/2023  11:26 AM

## 2023-01-27 ENCOUNTER — ANESTHESIA (OUTPATIENT)
Dept: OPERATING ROOM | Age: 48
End: 2023-01-27
Payer: COMMERCIAL

## 2023-01-27 ENCOUNTER — ANESTHESIA EVENT (OUTPATIENT)
Dept: OPERATING ROOM | Age: 48
End: 2023-01-27
Payer: COMMERCIAL

## 2023-01-27 ENCOUNTER — HOSPITAL ENCOUNTER (OUTPATIENT)
Age: 48
Setting detail: OUTPATIENT SURGERY
Discharge: HOME | End: 2023-01-27
Attending: OBSTETRICS & GYNECOLOGY | Admitting: OBSTETRICS & GYNECOLOGY
Payer: COMMERCIAL

## 2023-01-27 VITALS
OXYGEN SATURATION: 100 % | WEIGHT: 155 LBS | BODY MASS INDEX: 25.83 KG/M2 | RESPIRATION RATE: 18 BRPM | TEMPERATURE: 98 F | SYSTOLIC BLOOD PRESSURE: 120 MMHG | HEIGHT: 65 IN | HEART RATE: 59 BPM | DIASTOLIC BLOOD PRESSURE: 71 MMHG

## 2023-01-27 DIAGNOSIS — N93.8 DUB (DYSFUNCTIONAL UTERINE BLEEDING): ICD-10-CM

## 2023-01-27 DIAGNOSIS — N84.0 ENDOMETRIUM, POLYP: ICD-10-CM

## 2023-01-27 LAB
HCG, URINE, POC: NEGATIVE
Lab: NORMAL
NEGATIVE QC PASS/FAIL: NORMAL
POSITIVE QC PASS/FAIL: NORMAL

## 2023-01-27 PROCEDURE — 2709999900 HC NON-CHARGEABLE SUPPLY: Performed by: OBSTETRICS & GYNECOLOGY

## 2023-01-27 PROCEDURE — 3600000003 HC SURGERY LEVEL 3 BASE: Performed by: OBSTETRICS & GYNECOLOGY

## 2023-01-27 PROCEDURE — 6360000002 HC RX W HCPCS: Performed by: NURSE ANESTHETIST, CERTIFIED REGISTERED

## 2023-01-27 PROCEDURE — 2500000003 HC RX 250 WO HCPCS: Performed by: NURSE ANESTHETIST, CERTIFIED REGISTERED

## 2023-01-27 PROCEDURE — 2580000003 HC RX 258

## 2023-01-27 PROCEDURE — 7100000001 HC PACU RECOVERY - ADDTL 15 MIN: Performed by: OBSTETRICS & GYNECOLOGY

## 2023-01-27 PROCEDURE — 2580000003 HC RX 258: Performed by: OBSTETRICS & GYNECOLOGY

## 2023-01-27 PROCEDURE — 6370000000 HC RX 637 (ALT 250 FOR IP): Performed by: OBSTETRICS & GYNECOLOGY

## 2023-01-27 PROCEDURE — 7100000011 HC PHASE II RECOVERY - ADDTL 15 MIN: Performed by: OBSTETRICS & GYNECOLOGY

## 2023-01-27 PROCEDURE — 7100000010 HC PHASE II RECOVERY - FIRST 15 MIN: Performed by: OBSTETRICS & GYNECOLOGY

## 2023-01-27 PROCEDURE — 6360000002 HC RX W HCPCS

## 2023-01-27 PROCEDURE — 3600000013 HC SURGERY LEVEL 3 ADDTL 15MIN: Performed by: OBSTETRICS & GYNECOLOGY

## 2023-01-27 PROCEDURE — 3700000000 HC ANESTHESIA ATTENDED CARE: Performed by: OBSTETRICS & GYNECOLOGY

## 2023-01-27 PROCEDURE — 3700000001 HC ADD 15 MINUTES (ANESTHESIA): Performed by: OBSTETRICS & GYNECOLOGY

## 2023-01-27 PROCEDURE — 58563 HYSTEROSCOPY ABLATION: CPT | Performed by: OBSTETRICS & GYNECOLOGY

## 2023-01-27 PROCEDURE — 7100000000 HC PACU RECOVERY - FIRST 15 MIN: Performed by: OBSTETRICS & GYNECOLOGY

## 2023-01-27 PROCEDURE — 88305 TISSUE EXAM BY PATHOLOGIST: CPT

## 2023-01-27 PROCEDURE — A4217 STERILE WATER/SALINE, 500 ML: HCPCS | Performed by: OBSTETRICS & GYNECOLOGY

## 2023-01-27 RX ORDER — METOCLOPRAMIDE HYDROCHLORIDE 5 MG/ML
10 INJECTION INTRAMUSCULAR; INTRAVENOUS
Status: DISCONTINUED | OUTPATIENT
Start: 2023-01-27 | End: 2023-01-27 | Stop reason: HOSPADM

## 2023-01-27 RX ORDER — FENTANYL CITRATE 0.05 MG/ML
50 INJECTION, SOLUTION INTRAMUSCULAR; INTRAVENOUS EVERY 10 MIN PRN
Status: DISCONTINUED | OUTPATIENT
Start: 2023-01-27 | End: 2023-01-27 | Stop reason: HOSPADM

## 2023-01-27 RX ORDER — MEPERIDINE HYDROCHLORIDE 25 MG/ML
12.5 INJECTION INTRAMUSCULAR; INTRAVENOUS; SUBCUTANEOUS
Status: DISCONTINUED | OUTPATIENT
Start: 2023-01-27 | End: 2023-01-27 | Stop reason: HOSPADM

## 2023-01-27 RX ORDER — MAGNESIUM HYDROXIDE 1200 MG/15ML
LIQUID ORAL CONTINUOUS PRN
Status: DISCONTINUED | OUTPATIENT
Start: 2023-01-27 | End: 2023-01-27 | Stop reason: HOSPADM

## 2023-01-27 RX ORDER — SODIUM CHLORIDE 9 MG/ML
25 INJECTION, SOLUTION INTRAVENOUS PRN
Status: DISCONTINUED | OUTPATIENT
Start: 2023-01-27 | End: 2023-01-27 | Stop reason: HOSPADM

## 2023-01-27 RX ORDER — ONDANSETRON 2 MG/ML
4 INJECTION INTRAMUSCULAR; INTRAVENOUS
Status: DISCONTINUED | OUTPATIENT
Start: 2023-01-27 | End: 2023-01-27 | Stop reason: HOSPADM

## 2023-01-27 RX ORDER — FENTANYL CITRATE 50 UG/ML
INJECTION, SOLUTION INTRAMUSCULAR; INTRAVENOUS PRN
Status: DISCONTINUED | OUTPATIENT
Start: 2023-01-27 | End: 2023-01-27 | Stop reason: SDUPTHER

## 2023-01-27 RX ORDER — SODIUM CHLORIDE, SODIUM LACTATE, POTASSIUM CHLORIDE, CALCIUM CHLORIDE 600; 310; 30; 20 MG/100ML; MG/100ML; MG/100ML; MG/100ML
INJECTION, SOLUTION INTRAVENOUS CONTINUOUS
Status: DISCONTINUED | OUTPATIENT
Start: 2023-01-27 | End: 2023-01-27 | Stop reason: HOSPADM

## 2023-01-27 RX ORDER — SODIUM CHLORIDE 0.9 % (FLUSH) 0.9 %
5-40 SYRINGE (ML) INJECTION EVERY 12 HOURS SCHEDULED
Status: DISCONTINUED | OUTPATIENT
Start: 2023-01-27 | End: 2023-01-27 | Stop reason: HOSPADM

## 2023-01-27 RX ORDER — DEXAMETHASONE SODIUM PHOSPHATE 4 MG/ML
INJECTION, SOLUTION INTRA-ARTICULAR; INTRALESIONAL; INTRAMUSCULAR; INTRAVENOUS; SOFT TISSUE PRN
Status: DISCONTINUED | OUTPATIENT
Start: 2023-01-27 | End: 2023-01-27 | Stop reason: SDUPTHER

## 2023-01-27 RX ORDER — SODIUM CHLORIDE 0.9 % (FLUSH) 0.9 %
5-40 SYRINGE (ML) INJECTION PRN
Status: DISCONTINUED | OUTPATIENT
Start: 2023-01-27 | End: 2023-01-27 | Stop reason: HOSPADM

## 2023-01-27 RX ORDER — KETOROLAC TROMETHAMINE 30 MG/ML
INJECTION, SOLUTION INTRAMUSCULAR; INTRAVENOUS PRN
Status: DISCONTINUED | OUTPATIENT
Start: 2023-01-27 | End: 2023-01-27 | Stop reason: SDUPTHER

## 2023-01-27 RX ORDER — DEXMEDETOMIDINE HYDROCHLORIDE 100 UG/ML
INJECTION, SOLUTION INTRAVENOUS PRN
Status: DISCONTINUED | OUTPATIENT
Start: 2023-01-27 | End: 2023-01-27 | Stop reason: SDUPTHER

## 2023-01-27 RX ORDER — SODIUM CHLORIDE 9 MG/ML
INJECTION, SOLUTION INTRAVENOUS PRN
Status: DISCONTINUED | OUTPATIENT
Start: 2023-01-27 | End: 2023-01-27 | Stop reason: HOSPADM

## 2023-01-27 RX ORDER — LIDOCAINE HYDROCHLORIDE 10 MG/ML
1 INJECTION, SOLUTION EPIDURAL; INFILTRATION; INTRACAUDAL; PERINEURAL
Status: DISCONTINUED | OUTPATIENT
Start: 2023-01-27 | End: 2023-01-27 | Stop reason: HOSPADM

## 2023-01-27 RX ORDER — OXYCODONE HYDROCHLORIDE 5 MG/1
5 TABLET ORAL
Status: DISCONTINUED | OUTPATIENT
Start: 2023-01-27 | End: 2023-01-27 | Stop reason: HOSPADM

## 2023-01-27 RX ORDER — ONDANSETRON 2 MG/ML
INJECTION INTRAMUSCULAR; INTRAVENOUS PRN
Status: DISCONTINUED | OUTPATIENT
Start: 2023-01-27 | End: 2023-01-27 | Stop reason: SDUPTHER

## 2023-01-27 RX ORDER — DIPHENHYDRAMINE HYDROCHLORIDE 50 MG/ML
12.5 INJECTION INTRAMUSCULAR; INTRAVENOUS
Status: DISCONTINUED | OUTPATIENT
Start: 2023-01-27 | End: 2023-01-27 | Stop reason: HOSPADM

## 2023-01-27 RX ORDER — LIDOCAINE HYDROCHLORIDE 20 MG/ML
INJECTION, SOLUTION INTRAVENOUS PRN
Status: DISCONTINUED | OUTPATIENT
Start: 2023-01-27 | End: 2023-01-27 | Stop reason: SDUPTHER

## 2023-01-27 RX ORDER — PROPOFOL 10 MG/ML
INJECTION, EMULSION INTRAVENOUS PRN
Status: DISCONTINUED | OUTPATIENT
Start: 2023-01-27 | End: 2023-01-27 | Stop reason: SDUPTHER

## 2023-01-27 RX ADMIN — CEFAZOLIN 2000 MG: 10 INJECTION, POWDER, FOR SOLUTION INTRAVENOUS at 13:42

## 2023-01-27 RX ADMIN — FENTANYL CITRATE 50 MCG: 50 INJECTION, SOLUTION INTRAMUSCULAR; INTRAVENOUS at 13:45

## 2023-01-27 RX ADMIN — SODIUM CHLORIDE, POTASSIUM CHLORIDE, SODIUM LACTATE AND CALCIUM CHLORIDE: 600; 310; 30; 20 INJECTION, SOLUTION INTRAVENOUS at 13:22

## 2023-01-27 RX ADMIN — LIDOCAINE HYDROCHLORIDE 60 MG: 20 INJECTION, SOLUTION INTRAVENOUS at 13:41

## 2023-01-27 RX ADMIN — FENTANYL CITRATE 50 MCG: 50 INJECTION, SOLUTION INTRAMUSCULAR; INTRAVENOUS at 14:01

## 2023-01-27 RX ADMIN — PHENYLEPHRINE HYDROCHLORIDE 100 MCG: 10 INJECTION INTRAVENOUS at 13:59

## 2023-01-27 RX ADMIN — ONDANSETRON 4 MG: 2 INJECTION INTRAMUSCULAR; INTRAVENOUS at 13:47

## 2023-01-27 RX ADMIN — DEXAMETHASONE SODIUM PHOSPHATE 4 MG: 4 INJECTION, SOLUTION INTRAMUSCULAR; INTRAVENOUS at 13:47

## 2023-01-27 RX ADMIN — DEXMEDETOMIDINE HCL 20 MCG: 100 INJECTION INTRAVENOUS at 13:34

## 2023-01-27 RX ADMIN — PHENYLEPHRINE HYDROCHLORIDE 100 MCG: 10 INJECTION INTRAVENOUS at 13:53

## 2023-01-27 RX ADMIN — KETOROLAC TROMETHAMINE 30 MG: 30 INJECTION, SOLUTION INTRAMUSCULAR at 14:13

## 2023-01-27 RX ADMIN — PROPOFOL 150 MCG/KG/MIN: 10 INJECTION, EMULSION INTRAVENOUS at 13:42

## 2023-01-27 RX ADMIN — PROPOFOL 100 MG: 10 INJECTION, EMULSION INTRAVENOUS at 13:41

## 2023-01-27 ASSESSMENT — PAIN SCALES - GENERAL
PAINLEVEL_OUTOF10: 4
PAINLEVEL_OUTOF10: 3
PAINLEVEL_OUTOF10: 0
PAINLEVEL_OUTOF10: 0
PAINLEVEL_OUTOF10: 4

## 2023-01-27 ASSESSMENT — PAIN DESCRIPTION - LOCATION
LOCATION: ABDOMEN

## 2023-01-27 ASSESSMENT — PAIN DESCRIPTION - DESCRIPTORS
DESCRIPTORS: CRAMPING
DESCRIPTORS: CRAMPING

## 2023-01-27 NOTE — OP NOTE
[address]                      [address], [address] [facility]                                OPERATIVE REPORT    PATIENT NAME: ALPHONSE Jarvis                    :        1975  MED REC NO:   [id number]                            ROOM:  ACCOUNT NO:   [de-identified]                           ADMIT DATE: 2023  PROVIDER:     Miriam Andrew MD    DATE OF PROCEDURE:  2023    PREOPERATIVE DIAGNOSES:  1. Dysfunctional uterine bleeding. 2.  Intrauterine polyp. POSTOPERATIVE DIAGNOSES:  1. Dysfunctional uterine bleeding. 2.  Intrauterine polyp. OPERATION PERFORMED:  Text. SURGEON:  Miriam Andrew MD    ANESTHESIA:  LMA per KATHY Stone.    ESTIMATED BLOOD LOSS:  Minimal.    FLUIDS:  800 mL LR.    URINE OUTPUT:  50 mL on straight catheterization. DRAINS:  None. PACKS:  None. SPECIMENS:  1. ECC. 2.  EMC and polyps. FINDINGS:  Hysteroscopy performed in the office revealed endometrial  polyp. The patient has experienced dysfunctional uterine bleeding. Desires no future fertility and desires endometrial ablation. Uterine  cavity length is 6.0. Uterine cavity width is 4.5 cm. The time of  ablation is 1 minute and 45 seconds. DESCRIPTION OF PROCEDURE:  The risks, benefits, alternatives and  indications of the procedure were discussed with the patient at great  length to include but not limited to bleeding, transfusion, infection,  anesthesia, death, injury of bowel, bladder or ureters and perforation  of the uterus as well as possible need for more extensive surgery. She  voiced understanding and desires to proceed. The patient was taken to the operating room awake, alert, oriented x3  and placed in the dorsal supine position on the operating table and then  general anesthesia is administered. The patient was then placed in the  dorsal lithotomy position in [address] [place] [address], prepped and draped in the  usual sterile fashion.   A sterile speculum was placed in the vagina,  anterior lip of the cervix was grasped with a single tooth tenaculum and  the uterus sounds to 7 cm. The uterus is sounded and then the ECC is  obtained. The cervix is serially dilated until the 7 mm inflow/outflow  hysteroscope can be easily admitted atraumatically. The above findings  are noted. Attention was then turned to the NovaSure ablation  technique. This was performed in usual standard procedure with the  above aforementioned measurement and provisions and per protocol after  assessing for cavity integrity and perforation. After completion of the  ablation the cavity was again inspected and noted to be adequately  ablated upon hysteroscopic evaluation post ablation. All instruments  were then removed from the uterus and vagina and silver nitrate was then  placed on anterior lip of the cervix for hemostasis. The patient was  awoken from anesthesia and taken to the PACU in stable condition. All  needle and instrument counts were correct x3.         Roverto Carmona MD    D: 01/27/2023 14:34:38       T: 01/27/2023 16:04:28     [id number]  Job#: [id number]     Doc#: [id number]    CC:   Primary Care Practitioner       Deanna Case MD

## 2023-01-27 NOTE — ANESTHESIA PRE PROCEDURE
Department of Anesthesiology  Preprocedure Note       Name:  Shelby Gan   Age:  41 y.o.  :  1975                                          MRN:  [id number]         Date:  2023      Surgeon: Casey Query):  Nakul Anaya MD    Procedure: Procedure(s):  DILATATION AND CURETTAGE POLYPECTOMY ENDOMETRIAL ABLATION    Medications prior to admission:   Prior to Admission medications    Medication Sig Start Date End Date Taking? Authorizing Provider   ferrous sulfate (IRON 325) 325 (65 Fe) MG tablet Take 325 mg by mouth daily (with breakfast)    Historical Provider, MD   VITAMIN D PO Take by mouth    Historical Provider, MD   Multiple Vitamins-Minerals (THERAPEUTIC MULTIVITAMIN-MINERALS) tablet Take 1 tablet by mouth daily    Historical Provider, MD   Lactobacillus (PROBIOTIC ACIDOPHILUS PO) Take by mouth    Historical Provider, MD       Current medications:    Current Facility-Administered Medications   Medication Dose Route Frequency Provider Last Rate Last Admin    lactated ringers IV soln infusion   IntraVENous Continuous Kasey Odor, APRN - CNP        ceFAZolin (ANCEF) 2000 mg in dextrose 5 % 100 mL IVPB  2,000 mg IntraVENous Once Kasey Odor, APRN - CNP        lidocaine PF 1 % injection 1 mL  1 mL IntraDERmal Once PRN Kasey Odor, APRN - CNP        sodium chloride flush 0.9 % injection 5-40 mL  5-40 mL IntraVENous 2 times per day Kasey Odor, APRN - CNP        sodium chloride flush 0.9 % injection 5-40 mL  5-40 mL IntraVENous PRN Kasey Odor, APRN - CNP        0.9 % sodium chloride infusion   IntraVENous PRN Kasey Odor, APRN - CNP           Allergies: Allergies   Allergen Reactions    Clindamycin Hcl Diarrhea     Diarrhea     Doxycycline Monohydrate Other (See Comments)     Abdominal cramping, nausea     Metronidazole Other (See Comments)     Abdominal cramping, nausea        Problem List:    Patient Active Problem List   Diagnosis Code    Depression F32. A    Anxiety F41.9    Dysfunctional uterine bleeding N93.8    Endometrial polyp N84.0       Past Medical History:        Diagnosis Date    Depression        Past Surgical History:        Procedure Laterality Date    NERVE SURGERY      right arm 2015? Social History:    Social History     Tobacco Use    Smoking status: Former     Packs/day: 0.50     Years: 15.00     Pack years: 7.50     Types: Cigarettes     Quit date: 2022     Years since quittin.6    Smokeless tobacco: Never   Substance Use Topics    Alcohol use: Yes     Comment: occ                                Counseling given: Not Answered      Vital Signs (Current):   Vitals:    23 1245   BP: 117/82   Pulse: 72   Resp: 18   Temp: (!) 96.1 °F (35.6 °C)   TempSrc: Temporal   SpO2: 98%   Weight: 155 lb (70.3 kg)   Height: 5' 5\" (1.651 m)                                              BP Readings from Last 3 Encounters:   23 117/82   23 133/85   10/10/19 108/80       NPO Status: Time of last liquid consumption: 1800                        Time of last solid consumption: 1800                        Date of last liquid consumption: 23                        Date of last solid food consumption: 23    BMI:   Wt Readings from Last 3 Encounters:   23 155 lb (70.3 kg)   23 159 lb 3.2 oz (72.2 kg)   10/10/19 148 lb (67.1 kg)     Body mass index is 25.79 kg/m².     CBC:   Lab Results   Component Value Date/Time    WBC 4.1 2023 11:01 AM    RBC 4.67 2023 11:01 AM    RBC 4.15 2018 07:43 PM    HGB 11.7 2023 11:01 AM    HCT 36.9 2023 11:01 AM    MCV 78.9 2023 11:01 AM    RDW 22.7 2023 11:01 AM     2023 11:01 AM       CMP:   Lab Results   Component Value Date/Time     2023 11:12 AM    K 4.5 2023 11:12 AM     2023 11:12 AM    CO2 24 2023 11:12 AM    BUN 13 2023 11:12 AM    CREATININE 0.79 2023 11:12 AM    AGRATIO 1.6 2018 07:43 PM    LABGLOM >60.0 01/20/2023 11:12 AM    GLUCOSE 111 01/20/2023 11:12 AM    GLUCOSE 100 12/11/2018 07:43 PM    PROT 7.0 01/20/2023 11:12 AM    CALCIUM 9.2 01/20/2023 11:12 AM    BILITOT 0.6 01/20/2023 11:12 AM    ALKPHOS 65 01/20/2023 11:12 AM    AST 16 01/20/2023 11:12 AM    ALT 14 01/20/2023 11:12 AM       POC Tests: No results for input(s): POCGLU, POCNA, POCK, POCCL, POCBUN, POCHEMO, POCHCT in the last 72 hours. Coags:   Lab Results   Component Value Date/Time    PROTIME 13.5 01/20/2023 11:06 AM    INR 1.0 01/20/2023 11:06 AM    APTT 27.9 01/20/2023 11:06 AM       HCG (If Applicable):   Lab Results   Component Value Date    PREGTESTUR Negative 12/11/2018    PREGSERUM Negative 03/14/2018        ABGs: No results found for: PHART, PO2ART, RQS4UOL, EHN7IFW, BEART, J3ZWHYPX     Type & Screen (If Applicable):  No results found for: LABABO, LABRH    Drug/Infectious Status (If Applicable):  No results found for: HIV, HEPCAB    COVID-19 Screening (If Applicable): No results found for: COVID19        Anesthesia Evaluation  Patient summary reviewed and Nursing notes reviewed no history of anesthetic complications:   Airway: Mallampati: II  TM distance: >3 FB   Neck ROM: full  Mouth opening: > = 3 FB   Dental: normal exam         Pulmonary:Negative Pulmonary ROS and normal exam                               Cardiovascular:Negative CV ROS                      Neuro/Psych:   Negative Neuro/Psych ROS              GI/Hepatic/Renal: Neg GI/Hepatic/Renal ROS            Endo/Other: Negative Endo/Other ROS                    Abdominal:             Vascular: negative vascular ROS. Other Findings:           Anesthesia Plan      general     ASA 2       Induction: intravenous. MIPS: Prophylactic antiemetics administered. Anesthetic plan and risks discussed with patient.                         Suleiman Arevalo MD   1/27/2023

## 2023-01-27 NOTE — ANESTHESIA POSTPROCEDURE EVALUATION
Department of Anesthesiology  Postprocedure Note    Patient: Jess Green  MRN: [id number]  YOB: 1975  Date of evaluation: 1/27/2023      Procedure Summary     Date: 01/27/23 Room / Location: [address]    Anesthesia Start: 7106 Anesthesia Stop: 4973    Procedure: DILATATION AND CURETTAGE POLYPECTOMY ENDOMETRIAL ABLATION (Vagina ) Diagnosis:       DUB (dysfunctional uterine bleeding)      Endometrium, polyp      (DUB, POLYP OF ENDOMETRIUM)    Surgeons: Caryl Gray MD Responsible Provider: Geronimo Whitfield MD    Anesthesia Type: general ASA Status: 2          Anesthesia Type: No value filed.     Cody Phase I: Cody Score: 4    Cody Phase II:        Anesthesia Post Evaluation    Patient location during evaluation: bedside  Patient participation: waiting for patient participation  Level of consciousness: sleepy but conscious  Airway patency: patent  Nausea & Vomiting: no nausea and no vomiting  Complications: no  Cardiovascular status: blood pressure returned to baseline and hemodynamically stable  Respiratory status: acceptable  Hydration status: euvolemic

## 2024-01-29 ENCOUNTER — HOSPITAL ENCOUNTER (OUTPATIENT)
Dept: WOMENS IMAGING | Age: 49
Discharge: HOME | End: 2024-01-31
Attending: OBSTETRICS & GYNECOLOGY
Payer: COMMERCIAL

## 2024-01-29 ENCOUNTER — TELEPHONE (OUTPATIENT)
Dept: WOMENS IMAGING | Age: 49
End: 2024-01-29

## 2024-01-29 VITALS — BODY MASS INDEX: 25.79 KG/M2 | HEIGHT: 65 IN

## 2024-01-29 DIAGNOSIS — Z12.31 ENCOUNTER FOR MAMMOGRAM TO ESTABLISH BASELINE MAMMOGRAM: ICD-10-CM

## 2024-01-29 PROCEDURE — 77063 BREAST TOMOSYNTHESIS BI: CPT

## 2024-01-29 NOTE — TELEPHONE ENCOUNTER
1/29/24  Patient showed Zohraer Ayeck  score of 22.26%.  Called patient and discussed the importance of following up with someone regarding this. Offered to make appointment with our breast surgeon and patient is not interested at this time. Encouraged her to call me if she has any questions or decides she would like appointment.

## 2025-01-20 ENCOUNTER — OFFICE VISIT (OUTPATIENT)
Age: 50
End: 2025-01-20
Payer: COMMERCIAL

## 2025-01-20 VITALS — BODY MASS INDEX: 25.79 KG/M2 | HEIGHT: 65 IN

## 2025-01-20 DIAGNOSIS — D23.39 DERMOID CYST OF FOREHEAD: Primary | ICD-10-CM

## 2025-01-20 PROCEDURE — 99213 OFFICE O/P EST LOW 20 MIN: CPT | Performed by: FAMILY MEDICINE

## 2025-01-20 SDOH — ECONOMIC STABILITY: FOOD INSECURITY: WITHIN THE PAST 12 MONTHS, YOU WORRIED THAT YOUR FOOD WOULD RUN OUT BEFORE YOU GOT MONEY TO BUY MORE.: NEVER TRUE

## 2025-01-20 SDOH — ECONOMIC STABILITY: FOOD INSECURITY: WITHIN THE PAST 12 MONTHS, THE FOOD YOU BOUGHT JUST DIDN'T LAST AND YOU DIDN'T HAVE MONEY TO GET MORE.: NEVER TRUE

## 2025-01-20 ASSESSMENT — PATIENT HEALTH QUESTIONNAIRE - PHQ9
SUM OF ALL RESPONSES TO PHQ QUESTIONS 1-9: 0
2. FEELING DOWN, DEPRESSED OR HOPELESS: NOT AT ALL
7. TROUBLE CONCENTRATING ON THINGS, SUCH AS READING THE NEWSPAPER OR WATCHING TELEVISION: NOT AT ALL
3. TROUBLE FALLING OR STAYING ASLEEP: NOT AT ALL
4. FEELING TIRED OR HAVING LITTLE ENERGY: NOT AT ALL
SUM OF ALL RESPONSES TO PHQ QUESTIONS 1-9: 0
6. FEELING BAD ABOUT YOURSELF - OR THAT YOU ARE A FAILURE OR HAVE LET YOURSELF OR YOUR FAMILY DOWN: NOT AT ALL
SUM OF ALL RESPONSES TO PHQ QUESTIONS 1-9: 0
SUM OF ALL RESPONSES TO PHQ QUESTIONS 1-9: 0
8. MOVING OR SPEAKING SO SLOWLY THAT OTHER PEOPLE COULD HAVE NOTICED. OR THE OPPOSITE, BEING SO FIGETY OR RESTLESS THAT YOU HAVE BEEN MOVING AROUND A LOT MORE THAN USUAL: NOT AT ALL
9. THOUGHTS THAT YOU WOULD BE BETTER OFF DEAD, OR OF HURTING YOURSELF: NOT AT ALL
SUM OF ALL RESPONSES TO PHQ9 QUESTIONS 1 & 2: 0
5. POOR APPETITE OR OVEREATING: NOT AT ALL

## 2025-01-20 NOTE — PROGRESS NOTES
[facility]  [facility]  [address], [place] [address]  Dept: [phone]  Dept Fax: [phone]  Loc: [phone]     Subjective  Lana Tucker, 49 y.o. female Established patient presents today with:  Chief Complaint   Patient presents with    Cyst     On forehead       History of Present Illness        Past Medical History:   Diagnosis Date    Depression      Past Surgical History:   Procedure Laterality Date    DILATION AND CURETTAGE OF UTERUS N/A 01/27/2023    DILATATION AND CURETTAGE POLYPECTOMY ENDOMETRIAL ABLATION performed by Mitzy Jeter MD at [facility] OR    NERVE SURGERY      right arm 2015?     Immunization History   Administered Date(s) Administered    COVID-19, PFIZER PURPLE top, DILUTE for use, (age 12 y+), 30mcg/0.3mL 04/10/2021, 05/01/2021, 01/05/2022    Influenza Virus Vaccine 01/03/2019, 09/17/2019     Current Outpatient Medications   Medication Sig Dispense Refill    ferrous sulfate (IRON 325) 325 (65 Fe) MG tablet Take 325 mg by mouth daily (with breakfast)      VITAMIN D PO Take by mouth      Multiple Vitamins-Minerals (THERAPEUTIC MULTIVITAMIN-MINERALS) tablet Take 1 tablet by mouth daily      Lactobacillus (PROBIOTIC ACIDOPHILUS PO) Take by mouth       No current facility-administered medications for this visit.     Allergies, PMH, Surgical Hx, Family Hx, and Social Hx reviewed and updated.  Health Maintenance reviewed.    Objective    Vitals:    01/20/25 1524   Height: 1.651 m (5' 5\")       Physical Exam  Skin:                No results found for this visit on 01/20/25.    Results        Assessment & Plan      No orders of the defined types were placed in this encounter.    No orders of the defined types were placed in this encounter.    There are no discontinued medications.    On this date 1/20/2025 I have spent 15 minutes reviewing previous notes, test results and face to

## 2025-01-23 NOTE — PROGRESS NOTES
GENERAL SURGERY  NEW PATIENT HISTORY AND PHYSICAL NOTE    Pt Name: Lana Tucker  MRN: [id number]    Date: 1/24/2025    Primary Care Physician: Jean-Pierre Varela MD  Referring Physician: Dr. Varela    Reason for evaluation: Forehead cyst      SUBJECTIVE:     History of Chief Complaint:    Lana is a 49 y.o. female with a PMH of dysfunctional uterine bleeding, and anxiety/ depression who presents with a forehead mass. She reports having had the mass for at least the past 1 year now. It is getting bigger. Denies any associated pain, drainage, or overlying skin changes.     Past Medical History:   Diagnosis Date    Depression      Past Surgical History:   Procedure Laterality Date    DILATION AND CURETTAGE OF UTERUS N/A 01/27/2023    DILATATION AND CURETTAGE POLYPECTOMY ENDOMETRIAL ABLATION [place] [facility] [place]    NERVE SURGERY      right arm 2015?     Prior to Admission medications    Medication Sig Start Date End Date Taking? Authorizing Provider   ferrous sulfate (IRON 325) 325 (65 Fe) MG tablet Take 1 tablet by mouth daily (with breakfast)   Yes Mgay Conner MD   VITAMIN D PO Take by mouth   Yes Magy Conner MD   Multiple Vitamins-Minerals (THERAPEUTIC MULTIVITAMIN-MINERALS) tablet Take 1 tablet by mouth daily   Yes Magy Conner MD   Lactobacillus (PROBIOTIC ACIDOPHILUS PO) Take by mouth   Yes Magy Conner MD     Allergies   Allergen Reactions    Clindamycin Hcl Diarrhea     Diarrhea     Doxycycline Monohydrate Other (See Comments)     Abdominal cramping, nausea     Metronidazole Other (See Comments)     Abdominal cramping, nausea      Family History   Problem Relation Age of Onset    High Blood Pressure Mother     Breast Cancer Mother     Cancer Father     No Known Problems Brother     Diabetes Maternal Grandfather     Diabetes Paternal Grandfather     Breast Cancer Maternal Aunt      Social History     Tobacco Use    Smoking status: Former     Current

## 2025-01-24 ENCOUNTER — OFFICE VISIT (OUTPATIENT)
Dept: SURGERY | Age: 50
End: 2025-01-24
Payer: COMMERCIAL

## 2025-01-24 VITALS
TEMPERATURE: 97.7 F | OXYGEN SATURATION: 98 % | WEIGHT: 152 LBS | SYSTOLIC BLOOD PRESSURE: 110 MMHG | HEIGHT: 65 IN | BODY MASS INDEX: 25.33 KG/M2 | DIASTOLIC BLOOD PRESSURE: 70 MMHG

## 2025-01-24 DIAGNOSIS — R22.0 HEAD MASS: Primary | ICD-10-CM

## 2025-01-24 PROCEDURE — 99203 OFFICE O/P NEW LOW 30 MIN: CPT | Performed by: SURGERY

## 2025-02-05 ENCOUNTER — HOSPITAL ENCOUNTER (OUTPATIENT)
Dept: WOMENS IMAGING | Age: 50
Discharge: HOME | End: 2025-02-07
Attending: OBSTETRICS & GYNECOLOGY
Payer: COMMERCIAL

## 2025-02-05 DIAGNOSIS — Z12.31 ENCOUNTER FOR SCREENING MAMMOGRAM FOR MALIGNANT NEOPLASM OF BREAST: ICD-10-CM

## 2025-02-05 PROCEDURE — 77063 BREAST TOMOSYNTHESIS BI: CPT

## 2025-02-05 NOTE — PROGRESS NOTES
GENERAL SURGERY   PROCEDURE NOTE    Pt Name: Lana Tucker  MRN: [id number]    Date: 2/6/2025    Primary Care Physician: Jean-Pierre Varela MD    Reason for follow up: Forehead cyst       SUBJECTIVE:     History of Chief Complaint:    Lana is a 49 y.o. female with a PMH of dysfunctional uterine bleeding, and anxiety/ depression who presents with a forehead mass. She reports having had the mass for at least the past 1 year now. It is getting bigger. Denies any associated pain, drainage, or overlying skin changes.      OBJECTIVE:   CURRENT VITALS: /80   Pulse 62   Temp 98.3 °F (36.8 °C)   Ht 1.651 m (5' 5\")   Wt 68 kg (150 lb)   LMP 01/15/2025   SpO2 99%   BMI 24.96 kg/m²      GEN: Alert and oriented x3, no acute distress, cooperative   SKIN: Skin color, texture, turgor normal. No rashes or lesions  HEENT: Head is normocephalic, atraumatic. EOMI. Small soft mobile mass in center of forehead about 7mm in size, slight overying capillary prominence (see photo below, taken with verbal permission from the patient)   NECK: Supple, symmetrical, trachea midline, skin normal  PULM: Chest symmetric, no increased work of breathing or accessory muscle use  CV: Heart regular rate   EXTREMITIES: Warm, dry       (Left: forehead cyst taken during last visit, right: after excision)    ASSESSMENT AND PLAN:   Lana Tucker is a 49 y.o. female with a PMH of dysfunctional uterine bleeding, and anxiety/ depression who presents for elective excision of left forehead cyst.       Consent signed for in-office excision of forehead mass after R/B/A discussed. Procedure was performed without complication. Local anesthesia was given then a 8mm incision was made over the mass. The cyst was excised and sent for permanent to pathology. The wound cavity was copious irrigated with saline. Hemostasis was ensured. The incision was closed with a single deep 4-0 monocryl. Covered with a steri strip. Educated patient for signs/ symptoms of

## 2025-02-06 ENCOUNTER — PROCEDURE VISIT (OUTPATIENT)
Dept: SURGERY | Age: 50
End: 2025-02-06

## 2025-02-06 VITALS
HEART RATE: 62 BPM | SYSTOLIC BLOOD PRESSURE: 122 MMHG | OXYGEN SATURATION: 99 % | TEMPERATURE: 98.3 F | WEIGHT: 150 LBS | DIASTOLIC BLOOD PRESSURE: 80 MMHG | BODY MASS INDEX: 24.99 KG/M2 | HEIGHT: 65 IN

## 2025-02-06 DIAGNOSIS — R22.0 HEAD MASS: Primary | ICD-10-CM

## 2025-02-06 DIAGNOSIS — D23.39 DERMOID CYST OF FOREHEAD: ICD-10-CM

## 2025-02-14 NOTE — PROGRESS NOTES
GENERAL SURGERY WOUND CHECK VISIT    Pt Name: Lana Tucker  MRN: [id number]  Date: 2/17/2025       SUBJECTIVE:   Lana Tucker is a 49 y.o. female who is now 11 days s/p forehead cyst excision. Upon presentation, pt reports doing well. She had a little pain after the local anesthetic wore off the following day. Since then only some mild pain if she hit her head. She has been careful washing her face. The steri strip remains in place. No incisional drainage, fevers, chills, or night sweats.     OBJECTIVE:   CURRENT VITALS: /70   Pulse 60   Temp 97 °F (36.1 °C)   Ht 1.651 m (5' 5\")   Wt 68.9 kg (152 lb)   LMP 01/15/2025   SpO2 98%   BMI 25.29 kg/m²      GEN: Alert and oriented x3, no acute distress, cooperative   SKIN: Skin color, texture, turgor normal. No rashes or lesions  HEENT: Head is normocephalic, atraumatic. EOMI. Forehead incision c/d/I without surrounding erythema, flat, nontender (see photo below, taken with verbal permission from the patient)   NECK: Supple, symmetrical, trachea midline, skin normal  PULM: Chest symmetric, no increased work of breathing or accessory muscle use  CV: Heart regular rate   EXTREMITIES: Warm, dry        PATHOLOGY:   FINAL DIAGNOSIS: EXCISION FOREHEAD CYST: CONSISTENT WITH MARKEDLY INFLAMED RUPTURED CYST     ASSESSMENT AND PLAN:   Lana Tucker is a 49 y.o. female now 11 days s/p forehead cyst excision.  Recovering well as expected.    Steri strip to fall off in time  Pathology results reviewed with patient  Return to clinic on an as needed basis     I have spent 15 minutes reviewing previous notes, test results and face to face with the patient discussing the diagnosis and importance of compliance with the treatment plan as well as documenting on the day of the visit.    Jazmín Gomez MD   General surgeon    Electronically signed by Jazmín Gomez MD

## 2025-02-17 ENCOUNTER — OFFICE VISIT (OUTPATIENT)
Dept: SURGERY | Age: 50
End: 2025-02-17

## 2025-02-17 VITALS
WEIGHT: 152 LBS | TEMPERATURE: 97 F | DIASTOLIC BLOOD PRESSURE: 70 MMHG | BODY MASS INDEX: 25.33 KG/M2 | HEART RATE: 60 BPM | SYSTOLIC BLOOD PRESSURE: 110 MMHG | HEIGHT: 65 IN | OXYGEN SATURATION: 98 %

## 2025-02-17 DIAGNOSIS — Z51.89 VISIT FOR WOUND CHECK: Primary | ICD-10-CM

## 2025-02-17 PROCEDURE — 99024 POSTOP FOLLOW-UP VISIT: CPT | Performed by: SURGERY

## (undated) DEVICE — CATHETER,URETHRAL,VINYL,FEMALE,6",14FR: Brand: MEDLINE

## (undated) DEVICE — PAD,NON-ADHERENT,3X8,STERILE,LF,1/PK: Brand: MEDLINE

## (undated) DEVICE — LINER INCONT W7XL14IN PEACH POLYMER FLUF ADH WT CNTOUR DLX

## (undated) DEVICE — GOWN,SIRUS,POLYRNF,BRTHSLV,XLN/XL,20/CS: Brand: MEDLINE

## (undated) DEVICE — GAUZE,SPONGE,4"X4",16PLY,XRAY,STRL,LF: Brand: MEDLINE

## (undated) DEVICE — CURETTE VAC DIA8MM PLAS CRV OPN TIP RIG DISP VACURET D/E

## (undated) DEVICE — LABEL MED MINI W/ MARKER

## (undated) DEVICE — SKIN PREP TRAY 4 COMPARTM TRAY: Brand: MEDLINE INDUSTRIES, INC.

## (undated) DEVICE — PACK,BASIC: Brand: MEDLINE

## (undated) DEVICE — WARMER SCP 2 ANTIFOG LAP DISP

## (undated) DEVICE — LITHOTOMY DRAPE WITH FLUID CONTROL POUCH: Brand: CONVERTORS

## (undated) DEVICE — GLOVE ORANGE PI 7   MSG9070

## (undated) DEVICE — Device

## (undated) DEVICE — TOWEL,OR,DSP,ST,BLUE,STD,4/PK,20PK/CS: Brand: MEDLINE

## (undated) DEVICE — COVER LT HNDL BLU PLAS